# Patient Record
Sex: FEMALE | Race: WHITE
[De-identification: names, ages, dates, MRNs, and addresses within clinical notes are randomized per-mention and may not be internally consistent; named-entity substitution may affect disease eponyms.]

---

## 2016-11-06 VITALS
DIASTOLIC BLOOD PRESSURE: 82 MMHG | DIASTOLIC BLOOD PRESSURE: 82 MMHG | SYSTOLIC BLOOD PRESSURE: 141 MMHG | DIASTOLIC BLOOD PRESSURE: 82 MMHG | SYSTOLIC BLOOD PRESSURE: 141 MMHG | SYSTOLIC BLOOD PRESSURE: 141 MMHG | SYSTOLIC BLOOD PRESSURE: 141 MMHG | DIASTOLIC BLOOD PRESSURE: 82 MMHG | SYSTOLIC BLOOD PRESSURE: 141 MMHG | DIASTOLIC BLOOD PRESSURE: 82 MMHG | SYSTOLIC BLOOD PRESSURE: 141 MMHG | SYSTOLIC BLOOD PRESSURE: 141 MMHG | SYSTOLIC BLOOD PRESSURE: 141 MMHG | SYSTOLIC BLOOD PRESSURE: 141 MMHG | DIASTOLIC BLOOD PRESSURE: 82 MMHG | DIASTOLIC BLOOD PRESSURE: 82 MMHG | DIASTOLIC BLOOD PRESSURE: 82 MMHG | DIASTOLIC BLOOD PRESSURE: 82 MMHG

## 2017-07-20 ENCOUNTER — HOSPITAL ENCOUNTER (OUTPATIENT)
Dept: HOSPITAL 63 - MAMMO | Age: 62
Discharge: HOME | End: 2017-07-20
Attending: SPECIALIST
Payer: OTHER GOVERNMENT

## 2017-07-20 DIAGNOSIS — Z12.31: Primary | ICD-10-CM

## 2017-07-20 PROCEDURE — 77063 BREAST TOMOSYNTHESIS BI: CPT

## 2017-07-20 NOTE — RAD
DATE: 7/20/2017.



EXAM: MAMMO MARCUS SCREENING BILATERAL.



HISTORY: Routine mammographic screening.



COMPARISON: 7/14/2016, 7/13/2015, 10/1/2014, 7/2/2014.



This study was interpreted with the benefit of Computerized Aided Detection

(CAD).



FINDINGS:



The breast parenchyma is primarily fatty replaced.



There are no suspicious masses, microcalcifications or architectural

distortion.  A few scattered calcifications appear benign. Small nodule

superiorly bilaterally are consistent with lymph nodes, are stable and benign.



BI-RADS CATEGORY: 2 BENIGN FINDING(S).



RECOMMENDED FOLLOW-UP: 12M 12 MONTH FOLLOW-UP.



PQRS compliance statement: Patient information was entered into a reminder

system with a target due date 7/20/2018 for the next mammogram.



Mammography is a sensitive method for finding small breast cancers, but it

does not detect them all and is not a substitute for careful clinical

examination.  A negative mammogram does not negate a clinically suspicious

finding and should not result in delay in biopsying a clinically suspicious

abnormality.



"Our facility is accredited by the [employer] Mammography

Program."

## 2018-07-30 ENCOUNTER — HOSPITAL ENCOUNTER (OUTPATIENT)
Dept: HOSPITAL 63 - MAMMO | Age: 63
Discharge: HOME | End: 2018-07-30
Attending: SPECIALIST
Payer: OTHER GOVERNMENT

## 2018-07-30 DIAGNOSIS — Z12.31: Primary | ICD-10-CM

## 2018-07-30 DIAGNOSIS — K21.9: ICD-10-CM

## 2018-07-30 DIAGNOSIS — E83.42: ICD-10-CM

## 2018-07-30 DIAGNOSIS — E78.5: ICD-10-CM

## 2018-07-30 DIAGNOSIS — E78.00: ICD-10-CM

## 2018-07-30 DIAGNOSIS — E03.9: ICD-10-CM

## 2018-07-30 PROCEDURE — 77067 SCR MAMMO BI INCL CAD: CPT

## 2018-07-31 NOTE — RAD
DATE: 7/30/2018



EXAM: DIGITAL SCREEN BILAT W/CAD



HISTORY: Routine screening



COMPARISON: 7/20/2017



This study was interpreted with the benefit of Computerized Aided Detection

(CAD).



The breast parenchyma is primarily fatty replaced. Breast parenchyma level

density A.



FINDINGS: The fibroglandular pattern is slightly nodular in character.  No new

or enlarging breast densities are seen.  Benign type calcifications are

present.  No suspicious microcalcifications have developed.





IMPRESSION: Stable mammograms without evidence of malignancy.





BI-RADS CATEGORY: 2 BENIGN FINDING(S)



RECOMMENDED FOLLOW-UP: 12M 12 MONTH FOLLOW-UP



PQRS compliance statement: Patient information was entered into a reminder

system with a target due date     for the next mammogram.



Mammography is a sensitive method for finding small breast cancers, but it

does not detect them all and is not a substitute for careful clinical

examination.  A negative mammogram does not negate a clinically suspicious

finding and should not result in delay in biopsying a clinically suspicious

abnormality.



"Our facility is accredited by the [employer] Mammography

Program."

## 2019-01-17 ENCOUNTER — HOSPITAL ENCOUNTER (OUTPATIENT)
Dept: HOSPITAL 63 - CT | Age: 64
Discharge: HOME | End: 2019-01-17
Payer: MEDICARE

## 2019-01-17 ENCOUNTER — HOSPITAL ENCOUNTER (OUTPATIENT)
Dept: HOSPITAL 63 - LAB | Age: 64
Discharge: HOME | End: 2019-01-17
Attending: PODIATRIST
Payer: MEDICARE

## 2019-01-17 DIAGNOSIS — D21.22: Primary | ICD-10-CM

## 2019-01-17 DIAGNOSIS — I10: ICD-10-CM

## 2019-01-17 DIAGNOSIS — J32.9: Primary | ICD-10-CM

## 2019-01-17 LAB
ALBUMIN SERPL-MCNC: 4 G/DL (ref 3.4–5)
ALBUMIN/GLOB SERPL: 1.1 {RATIO} (ref 1–1.7)
ALP SERPL-CCNC: 67 U/L (ref 46–116)
ALT SERPL-CCNC: 47 U/L (ref 14–59)
ANION GAP SERPL CALC-SCNC: 10 MMOL/L (ref 6–14)
AST SERPL-CCNC: 26 U/L (ref 15–37)
BASOPHILS # BLD AUTO: 0.1 X10^3/UL (ref 0–0.2)
BASOPHILS NFR BLD: 1 % (ref 0–3)
BILIRUB SERPL-MCNC: 0.4 MG/DL (ref 0.2–1)
BUN/CREAT SERPL: 23 (ref 6–20)
CA-I SERPL ISE-MCNC: 18 MG/DL (ref 7–20)
CALCIUM SERPL-MCNC: 9.3 MG/DL (ref 8.5–10.1)
CHLORIDE SERPL-SCNC: 106 MMOL/L (ref 98–107)
CO2 SERPL-SCNC: 25 MMOL/L (ref 21–32)
CREAT SERPL-MCNC: 0.8 MG/DL (ref 0.6–1)
EOSINOPHIL NFR BLD: 0.2 X10^3/UL (ref 0–0.7)
EOSINOPHIL NFR BLD: 4 % (ref 0–3)
ERYTHROCYTE [DISTWIDTH] IN BLOOD BY AUTOMATED COUNT: 13.1 % (ref 11.5–14.5)
GFR SERPLBLD BASED ON 1.73 SQ M-ARVRAT: 72 ML/MIN
GLOBULIN SER-MCNC: 3.5 G/DL (ref 2.2–3.8)
GLUCOSE SERPL-MCNC: 95 MG/DL (ref 70–99)
HCT VFR BLD CALC: 44.3 % (ref 36–47)
HGB BLD-MCNC: 15 G/DL (ref 12–15.5)
LYMPHOCYTES # BLD: 2.3 X10^3/UL (ref 1–4.8)
LYMPHOCYTES NFR BLD AUTO: 38 % (ref 24–48)
MCH RBC QN AUTO: 32 PG (ref 25–35)
MCHC RBC AUTO-ENTMCNC: 34 G/DL (ref 31–37)
MCV RBC AUTO: 93 FL (ref 79–100)
MONO #: 0.5 X10^3/UL (ref 0–1.1)
MONOCYTES NFR BLD: 8 % (ref 0–9)
NEUT #: 3.1 X10^3UL (ref 1.8–7.7)
NEUTROPHILS NFR BLD AUTO: 50 % (ref 31–73)
PLATELET # BLD AUTO: 284 X10^3/UL (ref 140–400)
POTASSIUM SERPL-SCNC: 3.9 MMOL/L (ref 3.5–5.1)
PROT SERPL-MCNC: 7.5 G/DL (ref 6.4–8.2)
RBC # BLD AUTO: 4.76 X10^6/UL (ref 3.5–5.4)
SODIUM SERPL-SCNC: 141 MMOL/L (ref 136–145)
WBC # BLD AUTO: 6.2 X10^3/UL (ref 4–11)

## 2019-01-17 PROCEDURE — 36415 COLL VENOUS BLD VENIPUNCTURE: CPT

## 2019-01-17 PROCEDURE — 70486 CT MAXILLOFACIAL W/O DYE: CPT

## 2019-01-17 PROCEDURE — 80053 COMPREHEN METABOLIC PANEL: CPT

## 2019-01-17 PROCEDURE — 85025 COMPLETE CBC W/AUTO DIFF WBC: CPT

## 2019-01-17 NOTE — RAD
CT of the paranasal sinuses without contrast, 1/17/2019:

 

HISTORY: Chronic cough, sinusitis

 

Noncontrast scans were obtained with multiplanar reconstructions produced.

 

There are defects in the superomedial walls of both maxillary sinuses 

compatible with previous sinus surgery. The ethmoid infundibulum of the 

ostiomeatal complex is widely patent bilaterally. There is moderate 

mucosal thickening in both maxillary sinuses, right greater than left. 

There is moderate mucosal thickening in both ethmoid sinuses and the 

medial aspect of the left frontal sinus. There is mild mucosal thickening 

involving both sphenoid sinuses. No free fluid is evident in the sinuses. 

The orbital contents are unremarkable.

 

IMPRESSION:

1. Surgical defects involving the medial aspects of both maxillary 

sinuses.

2. Paranasal sinus mucosal thickening, greatest in the maxillary and 

ethmoid sinuses, without evidence of free fluid.

 

 

PQRS Compliance Statement:

 

One or more of the following individualized dose reduction techniques were

utilized for this examination:  

1. Automated exposure control  

2. Adjustment of the mA and/or kV according to patient size  

3. Use of iterative reconstruction technique

 

 

Electronically signed by: Rick Moritz, MD (1/17/2019 5:19 PM) [facility]

## 2019-08-14 ENCOUNTER — HOSPITAL ENCOUNTER (OUTPATIENT)
Dept: HOSPITAL 63 - MAMMO | Age: 64
Discharge: HOME | End: 2019-08-14
Payer: MEDICARE

## 2019-08-14 DIAGNOSIS — Z12.31: Primary | ICD-10-CM

## 2019-08-14 PROCEDURE — 77067 SCR MAMMO BI INCL CAD: CPT

## 2019-08-14 PROCEDURE — 77063 BREAST TOMOSYNTHESIS BI: CPT

## 2019-08-28 NOTE — RAD
DATE: 8/14/2019



EXAM: MAMMO MARCUS SCREENING BILATERAL



HISTORY: Routine screening



COMPARISON: 7/14/2016, 7/20/2017, 7/30/2018 mammographic exams



This study was interpreted with the benefit of Computerized Aided Detection

(CAD).





Breast Density: SCATTERED The breast parenchyma shows scattered fibroglandular

densities. Breast parenchyma level B.





FINDINGS: Small focal asymmetries are stable.  No suspicious mass or

calcification cluster.  No distortion.  





IMPRESSION: Stable







BI-RADS CATEGORY: 1 NEGATIVE



RECOMMENDED FOLLOW-UP: 12M 12 MONTH FOLLOW-UP



PQRS compliance statement: Patient information was entered into a reminder

system with a target due date in one year for the next mammogram.



Mammography is a sensitive method for finding small breast cancers, but it

does not detect them all and is not a substitute for careful clinical

examination.  A negative mammogram does not negate a clinically suspicious

finding and should not result in delay in biopsying a clinically suspicious

abnormality.



"Our facility is accredited by the American [employer] Mammography

Program."

## 2019-12-16 ENCOUNTER — HOSPITAL ENCOUNTER (OUTPATIENT)
Dept: HOSPITAL 63 - MAMMO | Age: 64
Discharge: HOME | End: 2019-12-16
Attending: SPECIALIST
Payer: MEDICARE

## 2019-12-16 DIAGNOSIS — N63.10: Primary | ICD-10-CM

## 2019-12-16 PROCEDURE — 77065 DX MAMMO INCL CAD UNI: CPT

## 2019-12-16 NOTE — RAD
DATE: December 16, 2019



EXAM: MAMMO MARCUS DIAG RT



HISTORY: Patient feels like the right nipple is different than the left side.

No nipple discharge or palpable lump. No scaliness or itching of the nipple



COMPARISON: August 14, 2019 and July 30, 2018.



This study was interpreted with the benefit of Computerized Aided Detection

(CAD).







FINDINGS:



Breast Density: FATTY The breast parenchyma is primarily fatty replaced.

Breast parenchyma level density A..  There are no new dominant suspicious

masses, suspicious microcalcifications or evidence of architectural

distortion.



No skin thickening is evident. No change in the appearance of the nipple

areolar complex is seen mammographically. Small right breast nodules are

stable.







IMPRESSION: Stable mammogram of the right breast. No mammographic indicators

for malignancy. Recommend routine screening mammography.







BI-RADS CATEGORY: 2 BENIGN FINDING



RECOMMENDED FOLLOW-UP: 12M 12 MONTH FOLLOW-UP



PQRS compliance statement: Patient information was entered into a reminder

system with a target due date August 15, 2020 for the next mammogram. i.e. one

year from the most recent screening mammogram dated August 14, 2019.



Mammography is a sensitive method for finding small breast cancers, but it

does not detect them all and is not a substitute for careful clinical

examination.  A negative mammogram does not negate a clinically suspicious

finding and should not result in delay in biopsying a clinically suspicious

abnormality.



"Our facility is accredited by the American [employer] Mammography

Program."



The patient's breast density may affect the ability of mammography to detect

breast cancer. There are 4 categories of breast density, A, B, C and D. Breast

density A means that most of the breast tissue is replaced with adipose tissue

and therefore is not dense. Breast density B means that the breast tissue is

mildly dense and scattered. Breast density C means that the breast tissue is

heterogeneously dense. Breast density D means that the breast tissue is very

dense. Breast densities especially C and D may decrease the sensitivity of

mammography to detect breast cancer. Therefore, the patient may benefit from

3-D breast mammography (3D breast tomography) as a part of their screening

mammogram. Insurance may or may not pay for this additional imaging. The

patient's breast density based on today's mammogram is category A.

## 2020-08-20 ENCOUNTER — HOSPITAL ENCOUNTER (OUTPATIENT)
Dept: HOSPITAL 63 - MAMMO | Age: 65
Discharge: HOME | End: 2020-08-20
Attending: SPECIALIST
Payer: MEDICARE

## 2020-08-20 DIAGNOSIS — Z12.31: Primary | ICD-10-CM

## 2020-08-20 DIAGNOSIS — N95.9: ICD-10-CM

## 2020-08-20 DIAGNOSIS — N64.89: ICD-10-CM

## 2020-08-20 PROCEDURE — 77067 SCR MAMMO BI INCL CAD: CPT

## 2020-08-20 PROCEDURE — 77081 DXA BONE DENSITY APPENDICULR: CPT

## 2020-08-20 PROCEDURE — 77063 BREAST TOMOSYNTHESIS BI: CPT

## 2020-08-20 NOTE — RAD
EXAM: DUAL ENERGY X-RAY ABSORPTIOMETRY (DEXA).

 

HISTORY: Postmenopausal screening.

 

FINDINGS: The lowest measured T-score is -1.6 in the lumbar spine, based 

on a bone mineral density of 0.986 g/cm^2. Refer to the worksheets for 

full detail.

 

No comparison examinations are available. 

 

IMPRESSION:

Low bone mass. Bone mineral density yields a T-score between -1.0 and 

-2.5. Fracture risk is increased.

 

FRAX was not calculated.

 

METHODOLOGY: Dual energy x-ray absorptiometry was performed to measure 

bone mineral density. The following analysis is based on the 2019 Official

Positions of the International Society for Clinical Densitometry: 

 

Measurements of the hips and the average of L1-L4 are preferred. When the 

spine and/or hip cannot be feasibly measured or interpreted, or in the 

setting of hyperparathyroidism, distal radial bone mineral density may be 

measured. 

 

The lumbar spine T-score is based on the average bone mineral density of 

L1-L4. In the setting of artifact or anatomic abnormality, some lumbar 

levels may be excluded, and the remaining levels used for calculation. A 

single lumbar level is not used for diagnosis, and if only a single level 

is available for assessment, another anatomic site will be used to assign 

a diagnosis.

 

The hip T-score is based on the bone mineral density measurement of the 

femoral neck or total proximal femur of either side, whichever is lowest. 

Bilateral mean values are not used for diagnosis.

 

The forearm T-score is derived from 33% of the distal radius of the 

nondominant forearm.

 

For postmenopausal and perimenopausal women, and men age 50 or older, of 

all ethnic groups, T-scores are calculated through comparison of the 

current measurement with the NHANES III database standard for  

females aged 20-29 years. The lowest T-score of the evaluated anatomic 

sites is used to assign a diagnosis based on the World Health Organization

densitometric classification. 

 

In premenopausal females and males younger than age 50, a Z-score is 

calculated based on population specific reference data for patient sex and

self-reported ethnicity.

 

Electronically signed by: JOELLEN Parks MD (8/20/2020 10:32 AM) 

[id number]

## 2020-08-20 NOTE — RAD
EXAM: BILATERAL DIGITAL 3D SCREENING MAMMOGRAPHY.

 

HISTORY: Routine mammographic screening. 

 

TECHNIQUE: Bilateral digital 3D and tomographic images were obtained in CC

and MLO projections. Computer-aided detection was applied.

 

COMPARISON: 08/14/2019.

 

COMPOSITION: A. The breasts are almost entirely fatty.

 

FINDINGS: There are no suspicious masses, microcalcifications or 

architectural distortion. The parenchymal pattern is stable. Scattered 

calcifications are benign. An asymmetric parenchymal island 

superolaterally on the left is stable. A nodule superolaterally on the 

right is likely a benign intraparenchymal lymph node is also stable. 

 

BI-RADS CATEGORY 2: Benign.

 

RECOMMENDATION:

1. Routine screening mammography in one year.

 

If mammography demonstrates dense breast tissue (heterogenously dense or 

extremely dense, category C or D), which could hide abnormalities, and if 

other risk factors for breast cancer have been identified, supplemental 

screening tests that may be suggested by the ordering physician may be of 

benefit. Dense breast tissue, in and of itself, is a relatively common 

condition. Therefore, this information is not provided to cause undue 

concern, but rather to raise awareness and to promote discussion with the 

referring physician regarding the presence of other risk factors, in 

addition to dense breast tissue. The results of this mammography 

examination is provided to the patient and referring physician. The 

patient should contact their referring physician if any questions or 

concerns exist regarding this report.

 

PQRS compliance statement -  Patient information was entered into a 

reminder system with a target due date for the next mammogram. 

 

"Our facility is accredited by the American [employer] 

Mammography Program."

 

Electronically signed by: JOELLEN Parks MD (8/20/2020 5:43 PM) [id number]

## 2021-02-02 ENCOUNTER — APPOINTMENT (RX ONLY)
Dept: URBAN - METROPOLITAN AREA CLINIC 11 | Facility: CLINIC | Age: 66
Setting detail: DERMATOLOGY
End: 2021-02-02

## 2021-02-02 DIAGNOSIS — D48.5 NEOPLASM OF UNCERTAIN BEHAVIOR OF SKIN: ICD-10-CM

## 2021-02-02 PROCEDURE — 99202 OFFICE O/P NEW SF 15 MIN: CPT

## 2021-02-02 PROCEDURE — ? PATIENT SPECIFIC COUNSELING

## 2021-02-02 PROCEDURE — ? COUNSELING - NEOPLASM OF UNCERTAIN BEHAVIOR

## 2021-02-02 ASSESSMENT — LOCATION SIMPLE DESCRIPTION DERM: LOCATION SIMPLE: RIGHT HAND

## 2021-02-02 ASSESSMENT — LOCATION ZONE DERM: LOCATION ZONE: HAND

## 2021-02-02 ASSESSMENT — LOCATION DETAILED DESCRIPTION DERM: LOCATION DETAILED: RIGHT ULNAR DORSAL HAND

## 2021-02-02 NOTE — PROCEDURE: PATIENT SPECIFIC COUNSELING
Other (Free Text): Referred patient to a hand specialist at this time. Patient has an appointment to see Dr. Tereso Calvo 2/3/2021 for further care
Detail Level: Simple

## 2021-02-02 NOTE — HPI: CYST
Is This A New Presentation, Or A Follow-Up?: Cyst
Additional History: Patient is being referred by PCP

## 2021-02-19 ENCOUNTER — HOSPITAL ENCOUNTER (OUTPATIENT)
Dept: HOSPITAL 61 - KCIC MRI | Age: 66
End: 2021-02-19
Attending: ORTHOPAEDIC SURGERY
Payer: MEDICARE

## 2021-02-19 DIAGNOSIS — M19.041: Primary | ICD-10-CM

## 2021-02-19 DIAGNOSIS — M65.841: ICD-10-CM

## 2021-02-19 PROCEDURE — 73221 MRI JOINT UPR EXTREM W/O DYE: CPT

## 2021-02-19 NOTE — KCIC
EXAMINATION: MRI RIGHT WRIST WITHOUT IV CONTRAST



CLINICAL HISTORY: Recurrent right wrist ganglion cyst. Vit E indicates scar (2 prior surgery) and new
 lump.



TECHNIQUE: Multiplanar multisequential images obtained through the wrist without intravenous contrast
.



COMPARISON: None





FINDINGS:  



TRIANGULAR FIBROCARTILAGE: Degenerative changes.



SCAPHOLUNATE LIGAMENT: Intact.

LUNOTRIQUETRAL LIGAMENT: Intact.



FLEXOR TENDONS/CARPAL TUNNEL: Within normal limits.

EXTENSOR TENDONS: Moderate fluid surrounding the extensor carpi radialis brevis brevis and longus ten
dons and mild fluid surrounding the extensor pollicis longus tendon at the crossover point of the thi
rd and second extensor compartments. Tendinosis and mild to moderate fluid surrounding the extensor d
igitorum tendons just distal to this level. Of note, skin marker is placed between the second and fou
rth extensor compartments in this region.



BONES/MARROW: No evidence of acute fracture or suspicious marrow replacing process. Moderate to sever
e degenerative changes first CMC joint. Scattered small cystic changes in the carpals, nonspecific.



JOINT FLUID: No significant joint effusion. No evidence of recurrent ganglion at the region of intere
st along the dorsal wrist.





IMPRESSION:  



Tenosynovitis in the second-fourth extensor compartments as described, corresponding to marked region
 of interest.



No evidence of recurrent ganglion.



Moderate to severe degenerative changes first CMC joint.



Electronically signed by: Antonio Chester DO (2/19/2021 2:55 PM) [id number]

## 2021-08-31 ENCOUNTER — HOSPITAL ENCOUNTER (OUTPATIENT)
Dept: HOSPITAL 63 - MAMMO | Age: 66
End: 2021-08-31
Attending: SPECIALIST
Payer: MEDICARE

## 2021-08-31 DIAGNOSIS — Z12.31: Primary | ICD-10-CM

## 2021-08-31 PROCEDURE — 77063 BREAST TOMOSYNTHESIS BI: CPT

## 2021-08-31 PROCEDURE — 77067 SCR MAMMO BI INCL CAD: CPT

## 2021-08-31 NOTE — RAD
MG BILAT SCREEN+MARCUS 8/31/2021 9:25 AM



INDICATION: Asymptomatic screening mammogram.



COMPARISON: 8/20/2020, 12/16/2019



TECHNIQUE: 3D tomosynthesis was performed in CC and MLO projections. 2D views were obtained from the 
3D data. CAD was utilized as needed.



FINDINGS:



Breast density: Category B: There are scattered areas of fibroglandular density.



Right breast: There are no suspicious microcalcifications, masses or areas of architectural distortio
n.



Left breast: There are no suspicious microcalcifications, masses or areas of architectural distortion
.



Bilateral mammogram is compared to prior examinations appears unchanged.



IMPRESSION:



Negative bilateral mammogram.



BI-RADS category: 1; Negative



Recommendations: Recommend annual screening mammography in one year.



Electronically signed by: Bonnie Lemon MD (8/31/2021 5:10 PM) [id number]

## 2022-03-29 ENCOUNTER — HOSPITAL ENCOUNTER (OUTPATIENT)
Dept: HOSPITAL 63 - RAD | Age: 67
End: 2022-03-29
Attending: PHYSICIAN ASSISTANT
Payer: MEDICARE

## 2022-03-29 DIAGNOSIS — M17.12: Primary | ICD-10-CM

## 2022-03-29 PROCEDURE — 73562 X-RAY EXAM OF KNEE 3: CPT

## 2022-03-29 NOTE — RAD
XR KNEE _3 VIEWS_LT 



DATE:  3/29/2022 10:55 AM



INDICATION:  left knee pain   



COMPARISON:  None.



FINDINGS:



Bones: There is no evidence of acute fracture or dislocation.



Joints:  Moderate degenerative changes of the medial knee compartment, mild lateral and patellofemora
l compartment degenerative changes. There is no joint effusion. 



Miscellaneous: None.



IMPRESSION:  



Tricompartmental degenerative changes, worst and moderate in the medial compartment.



Electronically signed by: Oswaldo Brito MD (3/29/2022 4:39 PM) [id number]

## 2022-04-11 ENCOUNTER — HOSPITAL ENCOUNTER (OUTPATIENT)
Dept: HOSPITAL 61 - KCIC MRI | Age: 67
End: 2022-04-11
Attending: ORTHOPAEDIC SURGERY
Payer: MEDICARE

## 2022-04-11 DIAGNOSIS — M25.462: ICD-10-CM
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
## 2022-04-11 DIAGNOSIS — M25.862: ICD-10-CM
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
## 2022-04-11 DIAGNOSIS — S83.282A: Primary | ICD-10-CM

## 2022-04-11 DIAGNOSIS — M85.662: ICD-10-CM
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
## 2022-04-11 PROCEDURE — 73721 MRI JNT OF LWR EXTRE W/O DYE: CPT

## 2022-04-11 NOTE — KCIC
EXAMINATION:  MRI LEFT LOWER EXTREMITY JOINT WITHOUT



INDICATIONS:  Left knee pain. Generalized overall left knee pain in recent weeks



TECHNIQUE:  Multiplanar multisequence MRI of the left knee was obtained without contrast.



COMPARISON: None.



FINDINGS:  



MENISCI:  There is a horizontal tear of the posterior horn medial meniscus extending into the body. T
he lateral meniscus is intact.



LIGAMENTS:  The anterior and posterior cruciate ligaments are intact. The medial collateral ligament 
and lateral collateral ligament complex are intact.



EXTENSOR MECHANISM:  The quadriceps and patellar tendons are intact. Fat pads are normal. Retinacula 
are intact.



BONES AND CARTILAGE:  There is predominantly superficial cartilage loss along the patella. Small subc
hondral cysts. Trochlear cartilage is intact. There is deep partial thickness cartilage loss througho
ut the weightbearing lateral femoral condyle and partial thickness cartilage loss along the lateral t
ibial plateau. There is superficial partial-thickness cartilage loss in the lateral compartment. No a
cute fracture. Mild marrow edema deep to the posterior horn medial meniscus tear



OTHER:  Small joint effusion. Tiny leaking Baker cyst. Muscles and remaining tendons are intact.



IMPRESSION:  

1. Horizontal tear of the posterior horn and body medial meniscus.

2. Tricompartmental cartilage loss, greatest in medial compartment where there is deep partial-thickn
ess cartilage loss.

3. Small joint effusion.









Electronically signed by: Aditi Butler MD (4/11/2022 11:43 AM) [id number]

## 2022-04-15 ENCOUNTER — HOSPITAL ENCOUNTER (OUTPATIENT)
Dept: HOSPITAL 61 - SURG | Age: 67
Discharge: HOME | End: 2022-04-15
Attending: ORTHOPAEDIC SURGERY
Payer: MEDICARE

## 2022-04-15 VITALS — SYSTOLIC BLOOD PRESSURE: 126 MMHG | DIASTOLIC BLOOD PRESSURE: 66 MMHG

## 2022-04-15 VITALS — BODY MASS INDEX: 23.23 KG/M2 | WEIGHT: 162.26 LBS | HEIGHT: 70 IN

## 2022-04-15 VITALS — DIASTOLIC BLOOD PRESSURE: 69 MMHG | SYSTOLIC BLOOD PRESSURE: 123 MMHG

## 2022-04-15 DIAGNOSIS — Z72.89: ICD-10-CM

## 2022-04-15 DIAGNOSIS — Z88.8: ICD-10-CM
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
## 2022-04-15 DIAGNOSIS — Z98.890: ICD-10-CM
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
## 2022-04-15 DIAGNOSIS — I10: ICD-10-CM
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
## 2022-04-15 DIAGNOSIS — E66.9: ICD-10-CM

## 2022-04-15 DIAGNOSIS — S83.242A: Primary | ICD-10-CM

## 2022-04-15 DIAGNOSIS — E78.00: ICD-10-CM

## 2022-04-15 DIAGNOSIS — Z88.1: ICD-10-CM

## 2022-04-15 DIAGNOSIS — Z79.899: ICD-10-CM

## 2022-04-15 DIAGNOSIS — M17.12: ICD-10-CM

## 2022-04-15 DIAGNOSIS — Z90.710: ICD-10-CM

## 2022-04-15 DIAGNOSIS — K21.9: ICD-10-CM

## 2022-04-15 DIAGNOSIS — F41.9: ICD-10-CM

## 2022-04-15 PROCEDURE — A4930 STERILE, GLOVES PER PAIR: HCPCS

## 2022-04-15 PROCEDURE — 29881 ARTHRS KNE SRG MNISECTMY M/L: CPT

## 2022-04-15 NOTE — PDOC4
OPERATIVE NOTE


Date:


Date:  Apr 15, 2022





Pre-Op Diagnosis:


1.  66-year-old female osteoarthritis left knee


2.  Degenerative meniscus tear left knee





Post-Op Diagnosis:


Same





Procedure Performed:


1.  Diagnostic arthroscopy left knee


2.  Partial medial meniscectomy with debridement left knee





Surgeon:


Connie





Anesthesia Type:


General





Blood Loss:


20 cc





Specimans Obtained:


None





Complications:


Patient tolerated procedure well without any apparent complications.





Operative Note:


See dictation











TORO GLOVER            Apr 15, 2022 09:24

## 2022-04-15 NOTE — DISCH
DISCHARGE INSTRUCTIONS


Condition on Discharge


Condition on Discharge:  Stable





Activity After Discharge


Activity Instructions for Disc:  Resume previous activity, Activity as tracey

ated, Avoid exertion


Driving Instructions after Dis:  Do not drive today


Weight Bearing Status after Di:  Full weight bearing





Wound Incision Care


Wound/Incision Care:  Ice to area for comfort, Keep wound elevated, Change 

dressing


Other wound/incision instructi:  May change dressing postoperative day #3





Follow-Up


Follow up with:  10 to 14 days











ANTON OLSEN Jr. DO     Apr 15, 2022 08:11

## 2022-04-15 NOTE — OP
DATE OF SURGERY: 04/15/2022

PREOPERATIVE DIAGNOSIS:  Medial meniscal tear with degenerative joint disease, 

left knee.



POSTOPERATIVE DIAGNOSIS:  Medial meniscal tear with degenerative joint disease, 

left knee.



PROCEDURE:  Left knee arthroscopy with partial medial meniscectomy and joint 

debridement.



SURGEON:  Vipul Rosales Jr, DO



FIRST ASSISTANT:  Steven Gross.



ANESTHESIA:  General.



COMPLICATIONS:  None.



ESTIMATED BLOOD LOSS:  20 mL.



DESCRIPTION OF PROCEDURE:  The patient was taken to the operative suite, given 

general anesthetic.  Left lower extremity was placed in a knee ortega, prepped 

and draped in a sterile fashion.  Inferomedial and inferolateral portals were 

established.  Knee was insufflated with saline.  Visualization of the 

patellofemoral joint noted there to be some grade 2 and grade 3 changes, which 

were unstable; therefore, debridement was undertaken to the undersurface of the 

patella.  This was mainly the medial facet and the middle facet at this point.  

Sulcus had a small area of grade 3 changes, which were unstable.  This was 

briefly debrided.  There was no full-thickness chondral damage to the patella or

femoral sides of the joint.  Scope was then taken into the medial compartment.  

There were no loose bodies within the medial gutter. There was noted to be a 

tear of the medial meniscus.  Using basket forceps and a shaver, a partial 

medial meniscectomy was undertaken.  This was debrided back to stable tissue. 

Remnant was probed and noted to be very stable at this point.  The tibial side 

of the joint had grade 2 changes.  No significant abnormalities; however, the 

femoral side had grade 3 changes throughout the entire femoral condyle from 0 up

to 90 degrees of flexion.  No other abnormalities were noted.  Therefore, scope 

was then taken into the intercondylar region, the ACL was visualized and noted 

to be intact and the ACL and the PCL were stable with probing.  Lateral meniscus

was noted to be intact with only minimal changes at this point.  No need for 

significant debridement. The femoral side of the joint was completely intact on 

the articular surface. Tibial side was the same with some small fissuring, but 

no abnormalities that were unstable.  Therefore, this was thoroughly irrigated. 

No loose bodies within the lateral gutter.  This was irrigated and suctioned 

dry.  All instruments were removed.  Local was placed in the portal sites.  

Portal sites were reapproximated in an interrupted fashion using nylon.  Sterile

dressing was applied.  The patient was then taken from the operative bed to the 

postoperative bed, taken to the PACU in stable condition.







TARI/AVI/LORA

DR: Shania   DD: 04/15/2022 09:25

DT: 04/15/2022 09:43   TID: [id number]